# Patient Record
Sex: MALE | Race: WHITE | URBAN - METROPOLITAN AREA
[De-identification: names, ages, dates, MRNs, and addresses within clinical notes are randomized per-mention and may not be internally consistent; named-entity substitution may affect disease eponyms.]

---

## 2018-01-08 ENCOUNTER — IMPORTED ENCOUNTER (OUTPATIENT)
Dept: URBAN - METROPOLITAN AREA CLINIC 50 | Facility: CLINIC | Age: 73
End: 2018-01-08

## 2018-01-11 ENCOUNTER — IMPORTED ENCOUNTER (OUTPATIENT)
Dept: URBAN - METROPOLITAN AREA CLINIC 50 | Facility: CLINIC | Age: 73
End: 2018-01-11

## 2018-01-25 ENCOUNTER — IMPORTED ENCOUNTER (OUTPATIENT)
Dept: URBAN - METROPOLITAN AREA CLINIC 50 | Facility: CLINIC | Age: 73
End: 2018-01-25

## 2018-02-05 ENCOUNTER — IMPORTED ENCOUNTER (OUTPATIENT)
Dept: URBAN - METROPOLITAN AREA CLINIC 50 | Facility: CLINIC | Age: 73
End: 2018-02-05

## 2018-02-05 NOTE — PATIENT DISCUSSION
"""[place] with IOL OS: 02/15/2018 [place] AAB00 21.5 [place] [facility] [place]

## 2018-02-15 ENCOUNTER — IMPORTED ENCOUNTER (OUTPATIENT)
Dept: URBAN - METROPOLITAN AREA CLINIC 50 | Facility: CLINIC | Age: 73
End: 2018-02-15

## 2018-02-15 NOTE — PATIENT DISCUSSION
"""S/P IOL OS: Sensar AAB00 21.5 (Target: [place]) +TM. Continue post operative instructions and drops per schedule.  """

## 2018-02-20 ENCOUNTER — IMPORTED ENCOUNTER (OUTPATIENT)
Dept: URBAN - METROPOLITAN AREA CLINIC 50 | Facility: CLINIC | Age: 73
End: 2018-02-20

## 2018-03-08 ENCOUNTER — IMPORTED ENCOUNTER (OUTPATIENT)
Dept: URBAN - METROPOLITAN AREA CLINIC 50 | Facility: CLINIC | Age: 73
End: 2018-03-08

## 2018-03-08 NOTE — PATIENT DISCUSSION
"""S/P IOL OD: Sensar AAB00 22.0 (Target: [place]) +TM. Continue post operative instructions and drops per schedule.  """

## 2018-03-12 ENCOUNTER — IMPORTED ENCOUNTER (OUTPATIENT)
Dept: URBAN - METROPOLITAN AREA CLINIC 50 | Facility: CLINIC | Age: 73
End: 2018-03-12

## 2018-03-12 NOTE — PATIENT DISCUSSION
"""S/P IOL OD: Sensar AAB00 22.0 (Target: [place]) +TM. Continue post operative instructions and drops per schedule.  """

## 2018-04-02 ENCOUNTER — IMPORTED ENCOUNTER (OUTPATIENT)
Dept: URBAN - METROPOLITAN AREA CLINIC 50 | Facility: CLINIC | Age: 73
End: 2018-04-02

## 2018-04-02 NOTE — PATIENT DISCUSSION
"""S/P IOL OU: OD: Sensar AAB00 22.0 (Target: [place]) +TM. OS: Sensar AAB00 21.5 (Target: [place]) +TM. "

## 2018-07-30 ENCOUNTER — IMPORTED ENCOUNTER (OUTPATIENT)
Dept: URBAN - METROPOLITAN AREA CLINIC 50 | Facility: CLINIC | Age: 73
End: 2018-07-30

## 2019-07-29 ENCOUNTER — IMPORTED ENCOUNTER (OUTPATIENT)
Dept: URBAN - METROPOLITAN AREA CLINIC 50 | Facility: CLINIC | Age: 74
End: 2019-07-29

## 2020-08-03 ENCOUNTER — IMPORTED ENCOUNTER (OUTPATIENT)
Dept: URBAN - METROPOLITAN AREA CLINIC 50 | Facility: CLINIC | Age: 75
End: 2020-08-03

## 2021-04-17 ASSESSMENT — VISUAL ACUITY
OS_OTHER: 20/30. 20/40.
OD_SC: 20/20 SLOW
OD_SC: 20/25
OD_CC: 20/30-
OS_BAT: 20/50-
OD_SC: 20/50-2
OD_OTHER: 20/40. 20/60.
OS_BAT: 20/30
OS_OTHER: 20/40. >20/400.
OD_CC: J1-
OS_BAT: 20/30
OD_OTHER: 20/80. 20/400.
OD_CC: J1+
OD_BAT: 20/40
OD_BAT: 20/80
OD_SC: 20/40+2
OD_SC: 20/25
OD_BAT: 20/40-
OS_CC: J1+
OD_SC: 20/40-1
OD_PH: 20/30-2
OD_BAT: 20/50
OS_SC: 20/50-2
OS_OTHER: 20/50-. 20/60.
OD_BAT: >20/400
OS_OTHER: 20/400.
OS_CC: J1+
OS_OTHER: 20/50-. 20/50-.
OS_SC: 20/25
OS_OTHER: 20/30. 20/50.
OD_OTHER: 20/50. >20/400.
OD_SC: 20/25-1
OS_CC: J1-
OS_BAT: 20/400
OD_BAT: 20/40-
OD_CC: J1+
OS_SC: 20/25-1
OD_BAT: 20/40-
OS_CC: 20/40
OS_OTHER: 20/50-. 20/50-.
OD_OTHER: >20/400.
OS_BAT: 20/50-
OS_SC: 20/25-2
OS_BAT: 20/40
OS_SC: 20/30-1
OS_BAT: 20/50-
OS_SC: 20/20

## 2021-04-17 ASSESSMENT — TONOMETRY
OS_IOP_MMHG: 12
OD_IOP_MMHG: 22
OD_IOP_MMHG: 16
OD_IOP_MMHG: 20
OD_IOP_MMHG: 16
OD_IOP_MMHG: 16
OD_IOP_MMHG: 15
OS_IOP_MMHG: 16
OD_IOP_MMHG: 20
OD_IOP_MMHG: 13
OS_IOP_MMHG: 17
OS_IOP_MMHG: 15
OD_IOP_MMHG: 12
OS_IOP_MMHG: 12
OS_IOP_MMHG: 16
OS_IOP_MMHG: 16
OS_IOP_MMHG: 17

## 2021-08-02 ENCOUNTER — PREPPED CHART (OUTPATIENT)
Dept: URBAN - METROPOLITAN AREA CLINIC 50 | Facility: CLINIC | Age: 76
End: 2021-08-02

## 2021-08-09 ENCOUNTER — ANNUAL COMPREHENSIVE EXAM (OUTPATIENT)
Dept: URBAN - METROPOLITAN AREA CLINIC 50 | Facility: CLINIC | Age: 76
End: 2021-08-09

## 2021-08-09 DIAGNOSIS — H35.372: ICD-10-CM

## 2021-08-09 DIAGNOSIS — H35.3131: ICD-10-CM

## 2021-08-09 DIAGNOSIS — H35.361: ICD-10-CM

## 2021-08-09 DIAGNOSIS — H43.813: ICD-10-CM

## 2021-08-09 DIAGNOSIS — H26.493: ICD-10-CM

## 2021-08-09 PROCEDURE — 92014 COMPRE OPH EXAM EST PT 1/>: CPT

## 2021-08-09 PROCEDURE — 92134 CPTRZ OPH DX IMG PST SGM RTA: CPT

## 2021-08-09 ASSESSMENT — VISUAL ACUITY
OD_CC: J1
OU_CC: J1+
OS_CC: J1
OD_CC: 20/20
OS_GLARE: 20/60
OS_GLARE: 20/400
OU_CC: 20/20
OD_GLARE: 20/30
OD_GLARE: 20/50
OS_CC: 20/20

## 2021-08-09 ASSESSMENT — TONOMETRY
OS_IOP_MMHG: 13
OD_IOP_MMHG: 13

## 2022-04-09 NOTE — PATIENT DISCUSSION
"""Informed patient that their cataract is visually significant and meets the criteria for cataract surgery to increase their vision and decrease their glare symptoms.  RBALs of surgery discussed English

## 2022-08-15 ENCOUNTER — COMPREHENSIVE EXAM (OUTPATIENT)
Dept: URBAN - METROPOLITAN AREA CLINIC 50 | Facility: CLINIC | Age: 77
End: 2022-08-15

## 2022-08-15 DIAGNOSIS — H35.3131: ICD-10-CM

## 2022-08-15 DIAGNOSIS — H43.822: ICD-10-CM

## 2022-08-15 DIAGNOSIS — H43.813: ICD-10-CM

## 2022-08-15 DIAGNOSIS — H26.493: ICD-10-CM

## 2022-08-15 DIAGNOSIS — H35.373: ICD-10-CM

## 2022-08-15 PROCEDURE — 92014 COMPRE OPH EXAM EST PT 1/>: CPT

## 2022-08-15 PROCEDURE — 92134 CPTRZ OPH DX IMG PST SGM RTA: CPT

## 2022-08-15 ASSESSMENT — VISUAL ACUITY
OS_GLARE: 20/20
OD_GLARE: 20/30
OS_GLARE: 20/30
OU_CC: J1+
OD_GLARE: 20/20
OS_CC: J1+
OD_CC: J1+
OD_SC: 20/20
OS_SC: 20/20
OU_SC: 20/20

## 2022-08-15 ASSESSMENT — TONOMETRY
OS_IOP_MMHG: 13
OD_IOP_MMHG: 13

## 2023-08-18 ENCOUNTER — COMPREHENSIVE EXAM (OUTPATIENT)
Dept: URBAN - METROPOLITAN AREA CLINIC 50 | Facility: LOCATION | Age: 78
End: 2023-08-18

## 2023-08-18 DIAGNOSIS — H26.493: ICD-10-CM

## 2023-08-18 DIAGNOSIS — H35.373: ICD-10-CM

## 2023-08-18 DIAGNOSIS — H43.822: ICD-10-CM

## 2023-08-18 DIAGNOSIS — H35.361: ICD-10-CM

## 2023-08-18 DIAGNOSIS — H35.3131: ICD-10-CM

## 2023-08-18 PROCEDURE — 92014 COMPRE OPH EXAM EST PT 1/>: CPT

## 2023-08-18 PROCEDURE — 92134 CPTRZ OPH DX IMG PST SGM RTA: CPT

## 2023-08-18 ASSESSMENT — VISUAL ACUITY
OS_GLARE: 20/40
OD_CC: 20/25+2
OS_CC: 20/25
OD_GLARE: 20/30-2
OU_CC: 20/20-3
OD_GLARE: 20/40
OU_CC: J1+
OS_GLARE: 20/30+2

## 2023-08-18 ASSESSMENT — TONOMETRY
OD_IOP_MMHG: 12
OS_IOP_MMHG: 12

## 2024-08-14 ENCOUNTER — COMPREHENSIVE EXAM (OUTPATIENT)
Dept: URBAN - METROPOLITAN AREA CLINIC 50 | Facility: LOCATION | Age: 79
End: 2024-08-14

## 2024-08-14 DIAGNOSIS — H43.822: ICD-10-CM

## 2024-08-14 DIAGNOSIS — H35.361: ICD-10-CM

## 2024-08-14 DIAGNOSIS — H26.493: ICD-10-CM

## 2024-08-14 DIAGNOSIS — H43.813: ICD-10-CM

## 2024-08-14 DIAGNOSIS — H33.21: ICD-10-CM

## 2024-08-14 DIAGNOSIS — H35.373: ICD-10-CM

## 2024-08-14 DIAGNOSIS — H52.4: ICD-10-CM

## 2024-08-14 DIAGNOSIS — H35.3131: ICD-10-CM

## 2024-08-14 PROCEDURE — 99214 OFFICE O/P EST MOD 30 MIN: CPT

## 2024-08-14 PROCEDURE — 92134 CPTRZ OPH DX IMG PST SGM RTA: CPT

## 2024-08-14 PROCEDURE — 92015 DETERMINE REFRACTIVE STATE: CPT

## 2024-08-14 ASSESSMENT — KERATOMETRY
OD_AXISANGLE2_DEGREES: 90
OD_K1POWER_DIOPTERS: 44.75
OS_AXISANGLE_DEGREES: 14
OS_K1POWER_DIOPTERS: 44.75
OS_K2POWER_DIOPTERS: 45.25
OD_K2POWER_DIOPTERS: 44.75
OD_AXISANGLE_DEGREES: 0
OS_AXISANGLE2_DEGREES: 104

## 2024-08-14 ASSESSMENT — TONOMETRY
OS_IOP_MMHG: 13
OD_IOP_MMHG: 14

## 2024-08-14 ASSESSMENT — VISUAL ACUITY
OU_CC: J1+@16
OS_GLARE: 20/40
OD_PH: 20/25
OD_GLARE: 20/30
OD_CC: 20/30-1
OD_GLARE: 20/40
OS_GLARE: 20/30
OS_CC: 20/25-1

## 2025-08-25 ENCOUNTER — COMPREHENSIVE EXAM (OUTPATIENT)
Age: 80
End: 2025-08-25

## 2025-08-25 DIAGNOSIS — H35.373: ICD-10-CM

## 2025-08-25 DIAGNOSIS — H26.493: ICD-10-CM

## 2025-08-25 DIAGNOSIS — H43.813: ICD-10-CM

## 2025-08-25 DIAGNOSIS — H35.3131: ICD-10-CM

## 2025-08-25 DIAGNOSIS — H35.361: ICD-10-CM

## 2025-08-25 DIAGNOSIS — H52.4: ICD-10-CM

## 2025-08-25 DIAGNOSIS — H43.822: ICD-10-CM

## 2025-08-25 PROCEDURE — 92015 DETERMINE REFRACTIVE STATE: CPT

## 2025-08-25 PROCEDURE — 92134 CPTRZ OPH DX IMG PST SGM RTA: CPT

## 2025-08-25 PROCEDURE — 66821 AFTER CATARACT LASER SURGERY: CPT

## 2025-08-25 PROCEDURE — 99214 OFFICE O/P EST MOD 30 MIN: CPT | Mod: 54

## 2025-08-25 RX ORDER — PREDNISOLONE ACETATE 10 MG/ML
1 SUSPENSION/ DROPS OPHTHALMIC
Start: 2025-08-25